# Patient Record
Sex: FEMALE | Race: WHITE | URBAN - METROPOLITAN AREA
[De-identification: names, ages, dates, MRNs, and addresses within clinical notes are randomized per-mention and may not be internally consistent; named-entity substitution may affect disease eponyms.]

---

## 2017-03-14 ENCOUNTER — OFFICE VISIT (OUTPATIENT)
Dept: OPHTHALMOLOGY | Facility: CLINIC | Age: 82
End: 2017-03-14
Payer: COMMERCIAL

## 2017-03-14 DIAGNOSIS — H43.811 POSTERIOR VITREOUS DETACHMENT, RIGHT: ICD-10-CM

## 2017-03-14 DIAGNOSIS — Z96.1 PSEUDOPHAKIA: ICD-10-CM

## 2017-03-14 DIAGNOSIS — H52.4 PRESBYOPIA: ICD-10-CM

## 2017-03-14 DIAGNOSIS — H40.1132 PRIMARY OPEN ANGLE GLAUCOMA OF BOTH EYES, MODERATE STAGE: Primary | ICD-10-CM

## 2017-03-14 PROCEDURE — 92015 DETERMINE REFRACTIVE STATE: CPT | Performed by: OPHTHALMOLOGY

## 2017-03-14 PROCEDURE — 92014 COMPRE OPH EXAM EST PT 1/>: CPT | Performed by: OPHTHALMOLOGY

## 2017-03-14 RX ORDER — LATANOPROST 50 UG/ML
1 SOLUTION/ DROPS OPHTHALMIC AT BEDTIME
Qty: 7.5 ML | Refills: 3 | Status: SHIPPED | OUTPATIENT
Start: 2017-03-14 | End: 2018-01-19

## 2017-03-14 RX ORDER — DORZOLAMIDE HYDROCHLORIDE AND TIMOLOL MALEATE 20; 5 MG/ML; MG/ML
1 SOLUTION/ DROPS OPHTHALMIC EVERY 12 HOURS
Qty: 3 BOTTLE | Refills: 4 | Status: SHIPPED | OUTPATIENT
Start: 2017-03-14

## 2017-03-14 ASSESSMENT — CUP TO DISC RATIO
OD_RATIO: 0.8
OS_RATIO: 0.8

## 2017-03-14 ASSESSMENT — REFRACTION_WEARINGRX
OS_ADD: +3.25
OS_AXIS: 020
OD_CYLINDER: +0.75
OS_SPHERE: -1.25
OS_CYLINDER: +1.00
OD_SPHERE: -0.75
OD_AXIS: 151
SPECS_TYPE: BIFOCAL
OD_ADD: +3.25

## 2017-03-14 ASSESSMENT — REFRACTION_MANIFEST
OS_ADD: +3.00
OD_ADD: +3.00
OS_CYLINDER: +0.75
OD_CYLINDER: +0.75
OS_AXIS: 045
OS_SPHERE: -1.50
OD_AXIS: 145
OD_SPHERE: -1.25

## 2017-03-14 ASSESSMENT — TONOMETRY
IOP_METHOD: APPLANATION
OD_IOP_MMHG: 18
OS_IOP_MMHG: 13

## 2017-03-14 ASSESSMENT — SLIT LAMP EXAM - LIDS: COMMENTS: 2+ DERMATOCHALASIS - UPPER LID

## 2017-03-14 ASSESSMENT — VISUAL ACUITY
OS_CC: J2
OS_CC+: -2
METHOD: SNELLEN - LINEAR
OS_CC: 20/30
OD_CC: J2
OD_CC: 20/30

## 2017-03-14 ASSESSMENT — EXTERNAL EXAM - LEFT EYE: OS_EXAM: NORMAL

## 2017-03-14 ASSESSMENT — CONF VISUAL FIELD
OD_NORMAL: 1
OS_NORMAL: 1

## 2017-03-14 ASSESSMENT — EXTERNAL EXAM - RIGHT EYE: OD_EXAM: NORMAL

## 2017-03-14 NOTE — MR AVS SNAPSHOT
"              After Visit Summary   3/14/2017    Caroline Mcmahon    MRN: [id number]           Patient Information     Date Of Birth          11/11/1927        Visit Information        Provider Department      3/14/2017 1:30 PM Bear Mesa MD [facility]        Today's Diagnoses     Presbyopia    -  1    Myopia, bilateral        Astigmatism, bilateral        Primary open angle glaucoma of both eyes, moderate stage        After-cataract with vision obscured, mild, bilateral        Posterior vitreous detachment, right        Pseudophakia, ou        Primary open-angle glaucoma, moderate stage, unspecified laterality          Care Instructions    Resume Dorzolamide/Timolol both eyes twice daily   Resume Latanoprost at bedtime both eyes   Glasses Rx given -optional   Schedule an appointment with local Ophthalmologist in September of 2017 for glaucoma testing.    Bear Mesa M.D.          Follow-ups after your visit        Who to contact     If you have questions or need follow up information about today's clinic visit or your schedule please contact [facility] directly at [phone].  Normal or non-critical lab and imaging results will be communicated to you by MyChart, letter or phone within 4 business days after the clinic has received the results. If you do not hear from us within 7 days, please contact the clinic through [employer] or phone. If you have a critical or abnormal lab result, we will notify you by phone as soon as possible.  Submit refill requests through [employer] or call your pharmacy and they will forward the refill request to us. Please allow 3 business days for your refill to be completed.          Additional Information About Your Visit        [employer] Information     [employer] lets you send messages to your doctor, view your test results, renew your prescriptions, schedule appointments and more. To sign up, go to [email or web address] . Click on \"Log in\" on the " "left side of the screen, which will take you to the Welcome page. Then click on \"Sign up Now\" on the right side of the page.     You will be asked to enter the access code listed below, as well as some personal information. Please follow the directions to create your username and password.     Your access code is: [email or web address]  Expires: 2017  2:37 PM     Your access code will  in 90 days. If you need help or a new code, please call your [place] clinic or [phone].        Care EveryWhere ID     This is your Care EveryWhere ID. This could be used by other organizations to access your [place] medical records  [id number]         Blood Pressure from Last 3 Encounters:   16 141/70   16 128/70   16 128/76    Weight from Last 3 Encounters:   16 68.5 kg (151 lb)   16 68.8 kg (151 lb 9.6 oz)   16 68.5 kg (151 lb)              Today, you had the following     No orders found for display       Primary Care Provider Office Phone # Fax #    [facility] [phone]       [address] [place] [address] [address]  [place] [address]        Thank you!     Thank you for choosing [facility]  for your care. Our goal is always to provide you with excellent care. Hearing back from our patients is one way we can continue to improve our services. Please take a few minutes to complete the written survey that you may receive in the mail after your visit with us. Thank you!             Your Updated Medication List - Protect others around you: Learn how to safely use, store and throw away your medicines at www.disposemymeds.org.          This list is accurate as of: 3/14/17  2:37 PM.  Always use your most recent med list.                   Brand Name Dispense Instructions for use    acetaminophen 500 MG Caps      1 CAPSULE EVERY 4 HOURS AS NEEDED       amLODIPine 2.5 MG tablet    NORVASC    90 tablet    Take 1 tablet (2.5 mg) by mouth daily       " ASPIRIN NOT PRESCRIBED    INTENTIONAL    0 each    Antiplatelet medication not prescribed intentionally due to being on plavix       BENEFIBER Chew          clopidogrel 75 MG tablet    PLAVIX    90 tablet    TAKE 1 TABLET EVERY DAY       CRESTOR 20 MG tablet   Generic drug:  rosuvastatin     90 tablet    TAKE 1 TABLET EVERY DAY       dorzolamide-timolol 2-0.5 % ophthalmic solution    COSOPT    3 Bottle    Place 1 drop into both eyes 2 times daily       latanoprost 0.005 % ophthalmic solution    XALATAN    7.5 mL    Place 1 drop into both eyes At Bedtime       melatonin 5 MG tablet      Take 5 mg by mouth At Bedtime       * order for DME     1 Device    Equipment being ordered: walker       * order for DME     1 Device    Equipment being ordered: knee high compression stockings, 10-20 mm HG, with ji       oxybutynin 5 MG tablet    DITROPAN    180 tablet    Take 1 tablet (5 mg) by mouth 2 times daily       senna-docusate 8.6-50 MG per tablet    SENOKOT-S;PERICOLACE    60 tablet    Take 1 tablet by mouth daily       * Notice:  This list has 2 medication(s) that are the same as other medications prescribed for you. Read the directions carefully, and ask your doctor or other care provider to review them with you.

## 2017-03-14 NOTE — PATIENT INSTRUCTIONS
Resume Dorzolamide/Timolol both eyes twice daily   Resume Latanoprost at bedtime both eyes   Glasses Rx given -optional   Schedule an appointment with local Ophthalmologist in September of 2017 for glaucoma testing.    Bear Mesa M.D.

## 2017-03-14 NOTE — PROGRESS NOTES
Current Eye Medications:  None     Subjective:  COMPLETE EYE EXAM   Patient moved to a [facility], and with the move and all, she stopped using all the eye drops.  She hasn't been using since just after the first of the year.  Patient will be transferring her care to a clinic in [place] closer to where she is living now.     Objective:  See Ophthalmology Exam.       Assessment:  Intraocular pressure up both eyes off glaucoma drops.  Discs stable.      ICD-10-CM    1. Primary open angle glaucoma of both eyes, moderate stage H40.1132 EYE EXAM (SIMPLE-NONBILLABLE)     dorzolamide-timolol (COSOPT) 2-0.5 % ophthalmic solution     latanoprost (XALATAN) 0.005 % ophthalmic solution   2. Pseudophakia, ou Z96.1    3. Posterior vitreous detachment, right H43.811    4. Presbyopia H52.4 REFRACTIVE STATUS        Plan: Resume Dorzolamide/Timolol both eyes twice daily   Resume Latanoprost at bedtime both eyes   Glasses Rx given -optional   Schedule an appointment with local Ophthalmologist in September of 2017 for glaucoma testing.    Bear Mesa M.D.

## 2018-01-18 ENCOUNTER — DOCUMENTATION ONLY (OUTPATIENT)
Dept: LAB | Facility: CLINIC | Age: 83
End: 2018-01-18

## 2018-01-18 NOTE — PROGRESS NOTES
This patient has overdue labs. A letter was sent on 12/13/2017 and there has been no appointment made. If you still want these labs done, please have your care team contact the patient to make a lab appointment. Otherwise, please have the labs discontinued and close the encounter.    Thank you,  [facility]

## 2018-01-19 DIAGNOSIS — H40.1132 PRIMARY OPEN ANGLE GLAUCOMA OF BOTH EYES, MODERATE STAGE: ICD-10-CM

## 2018-01-19 RX ORDER — LATANOPROST 50 UG/ML
1 SOLUTION/ DROPS OPHTHALMIC AT BEDTIME
Qty: 7.5 ML | Refills: 3 | Status: SHIPPED | OUTPATIENT
Start: 2018-01-19

## 2018-01-19 NOTE — PROGRESS NOTES
Spoke to patient. She no longer comes to [place].  She lives at [facility] in [place] and sees SULLY Justice from [facility], and has been seeing her for over a year.  Routing to RN to cancel lab orders. Ivone Foley,

## 2018-12-20 ENCOUNTER — RECORDS - HEALTHEAST (OUTPATIENT)
Dept: LAB | Facility: CLINIC | Age: 83
End: 2018-12-20

## 2018-12-20 LAB
ALBUMIN UR-MCNC: ABNORMAL MG/DL
APPEARANCE UR: ABNORMAL
BACTERIA #/AREA URNS HPF: ABNORMAL HPF
BILIRUB UR QL STRIP: NEGATIVE
COLOR UR AUTO: YELLOW
GLUCOSE UR STRIP-MCNC: NEGATIVE MG/DL
HGB UR QL STRIP: ABNORMAL
KETONES UR STRIP-MCNC: NEGATIVE MG/DL
LEUKOCYTE ESTERASE UR QL STRIP: ABNORMAL
MUCOUS THREADS #/AREA URNS LPF: ABNORMAL LPF
NITRATE UR QL: POSITIVE
PH UR STRIP: 6 [PH] (ref 4.5–8)
RBC #/AREA URNS AUTO: ABNORMAL HPF
SP GR UR STRIP: 1.02 (ref 1–1.03)
SQUAMOUS #/AREA URNS AUTO: ABNORMAL LPF
UROBILINOGEN UR STRIP-ACNC: ABNORMAL
WBC #/AREA URNS AUTO: >100 HPF
WBC CLUMPS #/AREA URNS HPF: PRESENT /[HPF]

## 2018-12-22 LAB — BACTERIA SPEC CULT: ABNORMAL

## 2019-01-16 ENCOUNTER — DOCUMENTATION ONLY (OUTPATIENT)
Dept: OPHTHALMOLOGY | Facility: CLINIC | Age: 84
End: 2019-01-16

## 2019-01-30 ENCOUNTER — RECORDS - HEALTHEAST (OUTPATIENT)
Dept: LAB | Facility: CLINIC | Age: 84
End: 2019-01-30

## 2019-02-01 LAB
ANION GAP SERPL CALCULATED.3IONS-SCNC: 8 MMOL/L (ref 5–18)
BUN SERPL-MCNC: 15 MG/DL (ref 8–28)
CALCIUM SERPL-MCNC: 9.3 MG/DL (ref 8.5–10.5)
CHLORIDE BLD-SCNC: 104 MMOL/L (ref 98–107)
CO2 SERPL-SCNC: 29 MMOL/L (ref 22–31)
CREAT SERPL-MCNC: 0.76 MG/DL (ref 0.6–1.1)
ERYTHROCYTE [DISTWIDTH] IN BLOOD BY AUTOMATED COUNT: 13.8 % (ref 11–14.5)
GFR SERPL CREATININE-BSD FRML MDRD: >60 ML/MIN/1.73M2
GLUCOSE BLD-MCNC: 99 MG/DL (ref 70–125)
HCT VFR BLD AUTO: 45.1 % (ref 35–47)
HGB BLD-MCNC: 14.2 G/DL (ref 12–16)
MCH RBC QN AUTO: 28.6 PG (ref 27–34)
MCHC RBC AUTO-ENTMCNC: 31.5 G/DL (ref 32–36)
MCV RBC AUTO: 91 FL (ref 80–100)
PLATELET # BLD AUTO: 310 THOU/UL (ref 140–440)
PMV BLD AUTO: 10.1 FL (ref 8.5–12.5)
POTASSIUM BLD-SCNC: 4 MMOL/L (ref 3.5–5)
RBC # BLD AUTO: 4.96 MILL/UL (ref 3.8–5.4)
SODIUM SERPL-SCNC: 141 MMOL/L (ref 136–145)
TSH SERPL DL<=0.005 MIU/L-ACNC: 1.28 UIU/ML (ref 0.3–5)
VIT B12 SERPL-MCNC: 369 PG/ML (ref 213–816)
WBC: 9.1 THOU/UL (ref 4–11)

## 2019-02-02 ENCOUNTER — RECORDS - HEALTHEAST (OUTPATIENT)
Dept: LAB | Facility: CLINIC | Age: 84
End: 2019-02-02

## 2019-02-02 LAB
ALBUMIN UR-MCNC: ABNORMAL MG/DL
APPEARANCE UR: ABNORMAL
BACTERIA #/AREA URNS HPF: ABNORMAL HPF
BILIRUB UR QL STRIP: NEGATIVE
COLOR UR AUTO: YELLOW
GLUCOSE UR STRIP-MCNC: NEGATIVE MG/DL
HGB UR QL STRIP: ABNORMAL
KETONES UR STRIP-MCNC: NEGATIVE MG/DL
LEUKOCYTE ESTERASE UR QL STRIP: ABNORMAL
MUCOUS THREADS #/AREA URNS LPF: ABNORMAL LPF
NITRATE UR QL: NEGATIVE
PH UR STRIP: 6 [PH] (ref 4.5–8)
RBC #/AREA URNS AUTO: ABNORMAL HPF
SP GR UR STRIP: 1.04 (ref 1–1.03)
SQUAMOUS #/AREA URNS AUTO: ABNORMAL LPF
UROBILINOGEN UR STRIP-ACNC: ABNORMAL
WBC #/AREA URNS AUTO: >100 HPF
WBC CLUMPS #/AREA URNS HPF: PRESENT /[HPF]

## 2019-02-04 LAB — BACTERIA SPEC CULT: ABNORMAL

## 2019-02-22 ENCOUNTER — RECORDS - HEALTHEAST (OUTPATIENT)
Dept: LAB | Facility: CLINIC | Age: 84
End: 2019-02-22

## 2019-02-22 LAB
ALBUMIN UR-MCNC: NEGATIVE MG/DL
APPEARANCE UR: ABNORMAL
BACTERIA #/AREA URNS HPF: ABNORMAL HPF
BILIRUB UR QL STRIP: NEGATIVE
COLOR UR AUTO: ABNORMAL
GLUCOSE UR STRIP-MCNC: NEGATIVE MG/DL
HGB UR QL STRIP: NEGATIVE
KETONES UR STRIP-MCNC: NEGATIVE MG/DL
LEUKOCYTE ESTERASE UR QL STRIP: ABNORMAL
NITRATE UR QL: POSITIVE
PH UR STRIP: 6 [PH] (ref 4.5–8)
RBC #/AREA URNS AUTO: ABNORMAL HPF
SP GR UR STRIP: 1 (ref 1–1.03)
SQUAMOUS #/AREA URNS AUTO: ABNORMAL LPF
UROBILINOGEN UR STRIP-ACNC: ABNORMAL
WBC #/AREA URNS AUTO: ABNORMAL HPF
WBC CLUMPS #/AREA URNS HPF: PRESENT /[HPF]

## 2019-02-24 LAB — BACTERIA SPEC CULT: ABNORMAL

## 2019-08-22 ENCOUNTER — RECORDS - HEALTHEAST (OUTPATIENT)
Dept: LAB | Facility: CLINIC | Age: 84
End: 2019-08-22

## 2019-08-23 LAB
ALBUMIN UR-MCNC: NEGATIVE MG/DL
APPEARANCE UR: ABNORMAL
BACTERIA #/AREA URNS HPF: ABNORMAL HPF
BILIRUB UR QL STRIP: NEGATIVE
COLOR UR AUTO: ABNORMAL
GLUCOSE UR STRIP-MCNC: NEGATIVE MG/DL
HGB UR QL STRIP: NEGATIVE
KETONES UR STRIP-MCNC: NEGATIVE MG/DL
LEUKOCYTE ESTERASE UR QL STRIP: ABNORMAL
MUCOUS THREADS #/AREA URNS LPF: ABNORMAL LPF
NITRATE UR QL: POSITIVE
PH UR STRIP: 8 [PH] (ref 4.5–8)
RBC #/AREA URNS AUTO: ABNORMAL HPF
SP GR UR STRIP: 1 (ref 1–1.03)
SQUAMOUS #/AREA URNS AUTO: ABNORMAL LPF
UROBILINOGEN UR STRIP-ACNC: ABNORMAL
WBC #/AREA URNS AUTO: ABNORMAL HPF
WBC CLUMPS #/AREA URNS HPF: PRESENT /[HPF]

## 2019-08-24 LAB — BACTERIA SPEC CULT: NORMAL

## 2019-09-20 ENCOUNTER — RECORDS - HEALTHEAST (OUTPATIENT)
Dept: LAB | Facility: CLINIC | Age: 84
End: 2019-09-20

## 2019-09-20 LAB
ALBUMIN UR-MCNC: ABNORMAL MG/DL
APPEARANCE UR: ABNORMAL
BACTERIA #/AREA URNS HPF: ABNORMAL HPF
BILIRUB UR QL STRIP: NEGATIVE
COLOR UR AUTO: ABNORMAL
GLUCOSE UR STRIP-MCNC: NEGATIVE MG/DL
HGB UR QL STRIP: NEGATIVE
KETONES UR STRIP-MCNC: NEGATIVE MG/DL
LEUKOCYTE ESTERASE UR QL STRIP: ABNORMAL
MUCOUS THREADS #/AREA URNS LPF: ABNORMAL LPF
NITRATE UR QL: POSITIVE
PH UR STRIP: 6 [PH] (ref 4.5–8)
RBC #/AREA URNS AUTO: ABNORMAL HPF
SP GR UR STRIP: 1.02 (ref 1–1.03)
SQUAMOUS #/AREA URNS AUTO: ABNORMAL LPF
UROBILINOGEN UR STRIP-ACNC: ABNORMAL
WBC #/AREA URNS AUTO: >100 HPF

## 2019-09-23 LAB
BACTERIA SPEC CULT: ABNORMAL
BACTERIA SPEC CULT: ABNORMAL

## 2019-11-06 ENCOUNTER — RECORDS - HEALTHEAST (OUTPATIENT)
Dept: LAB | Facility: CLINIC | Age: 84
End: 2019-11-06

## 2019-11-06 LAB — HGB BLD-MCNC: 11.8 G/DL (ref 12–16)
